# Patient Record
Sex: FEMALE | Race: BLACK OR AFRICAN AMERICAN | NOT HISPANIC OR LATINO | Employment: UNEMPLOYED | URBAN - METROPOLITAN AREA
[De-identification: names, ages, dates, MRNs, and addresses within clinical notes are randomized per-mention and may not be internally consistent; named-entity substitution may affect disease eponyms.]

---

## 2024-01-11 ENCOUNTER — APPOINTMENT (OUTPATIENT)
Dept: CARDIOLOGY | Facility: HOSPITAL | Age: 34
End: 2024-01-11
Payer: COMMERCIAL

## 2024-01-11 ENCOUNTER — HOSPITAL ENCOUNTER (EMERGENCY)
Facility: HOSPITAL | Age: 34
Discharge: HOME | End: 2024-01-11
Attending: EMERGENCY MEDICINE
Payer: COMMERCIAL

## 2024-01-11 ENCOUNTER — HOSPITAL ENCOUNTER (OUTPATIENT)
Facility: HOSPITAL | Age: 34
Discharge: HOME | End: 2024-01-11
Attending: OBSTETRICS & GYNECOLOGY | Admitting: OBSTETRICS & GYNECOLOGY
Payer: COMMERCIAL

## 2024-01-11 VITALS
SYSTOLIC BLOOD PRESSURE: 121 MMHG | WEIGHT: 249.98 LBS | HEART RATE: 96 BPM | DIASTOLIC BLOOD PRESSURE: 78 MMHG | RESPIRATION RATE: 18 BRPM | TEMPERATURE: 98.2 F | HEIGHT: 64 IN | BODY MASS INDEX: 42.68 KG/M2 | OXYGEN SATURATION: 98 %

## 2024-01-11 VITALS
SYSTOLIC BLOOD PRESSURE: 114 MMHG | BODY MASS INDEX: 42.68 KG/M2 | HEIGHT: 64 IN | HEART RATE: 86 BPM | DIASTOLIC BLOOD PRESSURE: 74 MMHG | OXYGEN SATURATION: 97 % | WEIGHT: 250 LBS | TEMPERATURE: 98.2 F | RESPIRATION RATE: 18 BRPM

## 2024-01-11 DIAGNOSIS — R07.89 ATYPICAL CHEST PAIN: Primary | ICD-10-CM

## 2024-01-11 LAB
ABO GROUP (TYPE) IN BLOOD: NORMAL
ALBUMIN SERPL BCP-MCNC: 3.2 G/DL (ref 3.4–5)
ALP SERPL-CCNC: 99 U/L (ref 33–110)
ALT SERPL W P-5'-P-CCNC: 20 U/L (ref 7–45)
ANION GAP SERPL CALC-SCNC: 16 MMOL/L (ref 10–20)
ANTIBODY SCREEN: NORMAL
AST SERPL W P-5'-P-CCNC: 13 U/L (ref 9–39)
ATRIAL RATE: 95 BPM
BILIRUB DIRECT SERPL-MCNC: 0 MG/DL (ref 0–0.3)
BILIRUB SERPL-MCNC: 0.2 MG/DL (ref 0–1.2)
BUN SERPL-MCNC: 7 MG/DL (ref 6–23)
CALCIUM SERPL-MCNC: 8.6 MG/DL (ref 8.6–10.3)
CARDIAC TROPONIN I PNL SERPL HS: <3 NG/L (ref 0–13)
CHLORIDE SERPL-SCNC: 104 MMOL/L (ref 98–107)
CO2 SERPL-SCNC: 19 MMOL/L (ref 21–32)
CREAT SERPL-MCNC: 0.34 MG/DL (ref 0.5–1.05)
EGFRCR SERPLBLD CKD-EPI 2021: >90 ML/MIN/1.73M*2
GLUCOSE SERPL-MCNC: 86 MG/DL (ref 74–99)
LDH SERPL L TO P-CCNC: 126 U/L (ref 84–246)
P AXIS: 31 DEGREES
P OFFSET: 201 MS
P ONSET: 143 MS
POTASSIUM SERPL-SCNC: 3.6 MMOL/L (ref 3.5–5.3)
PR INTERVAL: 144 MS
PROT SERPL-MCNC: 6.4 G/DL (ref 6.4–8.2)
Q ONSET: 215 MS
QRS COUNT: 16 BEATS
QRS DURATION: 92 MS
QT INTERVAL: 356 MS
QTC CALCULATION(BAZETT): 447 MS
QTC FREDERICIA: 414 MS
R AXIS: 14 DEGREES
RH FACTOR (ANTIGEN D): NORMAL
SODIUM SERPL-SCNC: 135 MMOL/L (ref 136–145)
T AXIS: 18 DEGREES
T OFFSET: 393 MS
VENTRICULAR RATE: 95 BPM

## 2024-01-11 PROCEDURE — 36415 COLL VENOUS BLD VENIPUNCTURE: CPT

## 2024-01-11 PROCEDURE — 83615 LACTATE (LD) (LDH) ENZYME: CPT | Performed by: ADVANCED PRACTICE MIDWIFE

## 2024-01-11 PROCEDURE — 84484 ASSAY OF TROPONIN QUANT: CPT | Performed by: ADVANCED PRACTICE MIDWIFE

## 2024-01-11 PROCEDURE — 93005 ELECTROCARDIOGRAM TRACING: CPT

## 2024-01-11 PROCEDURE — 99284 EMERGENCY DEPT VISIT MOD MDM: CPT | Performed by: EMERGENCY MEDICINE

## 2024-01-11 PROCEDURE — 36415 COLL VENOUS BLD VENIPUNCTURE: CPT | Performed by: ADVANCED PRACTICE MIDWIFE

## 2024-01-11 PROCEDURE — 84075 ASSAY ALKALINE PHOSPHATASE: CPT | Performed by: ADVANCED PRACTICE MIDWIFE

## 2024-01-11 PROCEDURE — 99204 OFFICE O/P NEW MOD 45 MIN: CPT | Performed by: ADVANCED PRACTICE MIDWIFE

## 2024-01-11 PROCEDURE — 99283 EMERGENCY DEPT VISIT LOW MDM: CPT

## 2024-01-11 PROCEDURE — 86901 BLOOD TYPING SEROLOGIC RH(D): CPT | Performed by: ADVANCED PRACTICE MIDWIFE

## 2024-01-11 PROCEDURE — 82248 BILIRUBIN DIRECT: CPT | Performed by: ADVANCED PRACTICE MIDWIFE

## 2024-01-11 PROCEDURE — 99214 OFFICE O/P EST MOD 30 MIN: CPT

## 2024-01-11 RX ORDER — LIDOCAINE HYDROCHLORIDE 10 MG/ML
0.5 INJECTION INFILTRATION; PERINEURAL ONCE AS NEEDED
Status: DISCONTINUED | OUTPATIENT
Start: 2024-01-11 | End: 2024-01-11 | Stop reason: HOSPADM

## 2024-01-11 RX ORDER — NIFEDIPINE 10 MG/1
10 CAPSULE ORAL ONCE AS NEEDED
Status: DISCONTINUED | OUTPATIENT
Start: 2024-01-11 | End: 2024-01-11 | Stop reason: HOSPADM

## 2024-01-11 RX ORDER — FERROUS SULFATE 325(65) MG
325 TABLET ORAL
COMMUNITY

## 2024-01-11 RX ORDER — HYDRALAZINE HYDROCHLORIDE 20 MG/ML
5 INJECTION INTRAMUSCULAR; INTRAVENOUS ONCE AS NEEDED
Status: DISCONTINUED | OUTPATIENT
Start: 2024-01-11 | End: 2024-01-11 | Stop reason: HOSPADM

## 2024-01-11 RX ORDER — LABETALOL HYDROCHLORIDE 5 MG/ML
20 INJECTION, SOLUTION INTRAVENOUS ONCE AS NEEDED
Status: DISCONTINUED | OUTPATIENT
Start: 2024-01-11 | End: 2024-01-11 | Stop reason: HOSPADM

## 2024-01-11 RX ORDER — ONDANSETRON HYDROCHLORIDE 2 MG/ML
4 INJECTION, SOLUTION INTRAVENOUS EVERY 6 HOURS PRN
Status: DISCONTINUED | OUTPATIENT
Start: 2024-01-11 | End: 2024-01-11 | Stop reason: HOSPADM

## 2024-01-11 RX ORDER — ONDANSETRON 4 MG/1
4 TABLET, FILM COATED ORAL EVERY 6 HOURS PRN
Status: DISCONTINUED | OUTPATIENT
Start: 2024-01-11 | End: 2024-01-11 | Stop reason: HOSPADM

## 2024-01-11 ASSESSMENT — COLUMBIA-SUICIDE SEVERITY RATING SCALE - C-SSRS
6. HAVE YOU EVER DONE ANYTHING, STARTED TO DO ANYTHING, OR PREPARED TO DO ANYTHING TO END YOUR LIFE?: NO
2. HAVE YOU ACTUALLY HAD ANY THOUGHTS OF KILLING YOURSELF?: NO
1. IN THE PAST MONTH, HAVE YOU WISHED YOU WERE DEAD OR WISHED YOU COULD GO TO SLEEP AND NOT WAKE UP?: NO

## 2024-01-11 ASSESSMENT — PAIN DESCRIPTION - ORIENTATION: ORIENTATION: RIGHT

## 2024-01-11 ASSESSMENT — PAIN SCALES - GENERAL
PAINLEVEL_OUTOF10: 6
PAINLEVEL_OUTOF10: 7

## 2024-01-11 ASSESSMENT — PAIN - FUNCTIONAL ASSESSMENT
PAIN_FUNCTIONAL_ASSESSMENT: 0-10
PAIN_FUNCTIONAL_ASSESSMENT: 0-10

## 2024-01-11 ASSESSMENT — PAIN DESCRIPTION - DESCRIPTORS
DESCRIPTORS: SHARP
DESCRIPTORS: SHARP

## 2024-01-11 ASSESSMENT — PAIN DESCRIPTION - LOCATION: LOCATION: CHEST

## 2024-01-11 NOTE — ED TRIAGE NOTES
Pt arrived via EMS from home with chief c/o of R sided CP under R breast starting last night. Pt is 8 months pregnant, IVONNE 2/24. Pt denies SOB, dizziness, N/V, ABD pain. Pt states the pain was worse last night. Pt denies any cardiac hx. Pt states baby is measuring small but no other problems with this pregnancy. Pt is Aox3.

## 2024-01-11 NOTE — ED NOTES
Chw talked to patient regarding not having a primary care physician. Patient expressed that she does have a primary care physician, but can' t remember their name at this time. Patient has [email or web address] insurance and patient expressed appreciation.     TYSHAWN Lorenzo  01/11/24 1000

## 2024-01-11 NOTE — H&P
"Obstetrical Triage Note    Reason for Triage Observation: Non-stress Test    Assessment   Pain in right upper abdomen, nausea, fatigue since yesterday  Triage Testing revealed Reactive NST, no uterine ctxs, normal labs   Patient's complaints have improved.    Plan   Discharge home.     Subjective   History of Present Pregnancy  Alexandra Bhakta is a 33 y.o.  gravid, female. Patient is pregnant. with an Estimated Date of Delivery of 2024 by LMP and consistent with US dating, who is now 33.5 weeks gestation. The patient's blood type is B POS. Her prenatal provider is [facility], available records reviewed.    Pregnancy notable for: BMI 42, anemia, prior csection x2    Triage Course:  Seen in ED, had EKG completed and was transferred to birth center for further evaluation. Normal fetal tracing and labs. Pt admits to relationship issues with her , does not admit to abuse or feeling unsafe in her home    Objective   Recent Vital Signs:                                 /78   Pulse 96   Temp 36.8 °C (98.2 °F) (Temporal)   Resp 18   Ht 1.626 m (5' 4\")   Wt 113 kg (249 lb 15.7 oz)   BMI 42.91 kg/m²     BP & Temp Min/Max Last 24 Hours:     BP  Min: 114/74   Min taken time: 24 1000  Max: 129/85   Max taken time: 24 0931  Temp  Av.8 °C (98.2 °F)  Min: 36.8 °C (98.2 °F)   Min taken time: 24 0910  Max: 36.8 °C (98.2 °F)   Max taken time: 24 0910    Physical Examination:  Constitutional: Alert and in no acute distress. Well developed, well nourished.  Head and Face: Head and face: Normal.   External inspection of ears and nose: Normal.  Pulmonary: Normal respiratory effort.  Genitourinary: External genitalia: Normal.  Uterus: Normal.   Right Adnexa/parametria: Normal.   Left Adnexa/parametria: Normal.   Inspection of Perianal Area: Normal.  Psychiatric: Alert and oriented x 3. Affect normal to patient baseline. Mood: Appropriate  Fetal: Baseline 140 with moderate variability and " accels present, no decels. Cat 1 tracing  Obstetrical: SVE deferred      Lab Review:     Lab Results   Component Value Date    AST 13 01/11/2024    ALT 20 01/11/2024    ALKPHOS 99 01/11/2024    BILITOT 0.2 01/11/2024    ALBUMIN 3.2 (L) 01/11/2024     Troponin normal  CBC anemic, normal platelets  Normal hepatic function panel  Normal LDH    Follow up with OB provider, see discharge instructions.    MANUEL Haider-ESTEFANIM

## 2024-01-30 NOTE — ED PROVIDER NOTES
HPI   Chief Complaint   Patient presents with    Chest Pain       HPI: []  33-year-old female about 8 months pregnant comes in with the pain under the right breast off-and-on for the last 24 hours.  No shortness of breath no nausea vomit diarrhea no fever no chills no vaginal spotting bleeding or discharge no cough or shortness breath no hemoptysis no hematemesis melena or hematochezia no urinary frequency urgency or hematuria.  Patient denies any contractions or denies leakage of fluid or denies cramping or vaginal bleeding    Past history: None  Social: Patient denies current tobacco alcohol drug abuse.  REVIEW OF SYSTEMS:    GENERAL.: No weight loss, fatigue, anorexia, insomnia, fever.    EYES: No vision loss, double vision, drainage, eye pain.    ENT: No pharyngitis, dry mouth.    CARDIOPULMONARY: No chest pain, palpitations, syncope, near syncope. No shortness of breath, cough, hemoptysis.    GI: Positive upper abdominal pain, change in bowel habits, melena, hematemesis, hematochezia, nausea, vomiting, diarrhea.    : No discharge, dysuria, frequency, urgency, hematuria.    MS: No limb pain, joint pain, joint swelling.    SKIN: No rashes.    PSYCH: No depression, anxiety, suicidality, homicidality.    Review of systems is otherwise negative unless stated above or in history of present illness.  Social history, family history, allergies reviewed.  PHYSICAL EXAM:    GENERAL: Vitals noted, no distress. Alert and oriented  x 3. Non-toxic.      EENT: TMs clear. Posterior oropharynx unremarkable. No meningismus. No LAD.     NECK: Supple. Nontender. No midline tenderness.     CARDIAC: Regular, rate, rhythm. No murmurs rubs or gallops. No JVD    PULMONARY: Lungs clear bilaterally with good aeration. No wheezes rales or rhonchi. No respiratory distress.     ABDOMEN: Soft, nonsurgical.  Gravid uterus nontender. No peritoneal signs. Normoactive bowel sounds. No pulsatile masses.  Negative CVA tenderness    EXTREMITIES:  No peripheral edema. Negative Homans bilaterally, no cords.  2+ bounding pulses well-perfused    SKIN: No rash. Intact.     NEURO: No focal neurologic deficits, NIH score of 0. Cranial nerves normal as tested from II through XII.     MEDICAL DECISION MAKING:  EKG on my interpretation shows normal sinus rhythm normal axis rate mid 80s with no acute ischemic changes.    Impression: #1third  trimester pregnancy, #2 atypical chest pain    Plan set MDM: Young 33-year-old female 8 months pregnant comes in with atypical chest pain low suspicion for STEMI NSTEMI ACS dissection pulm embolism or imminent miscarriage and/or imminent delivery patient medically cleared and will transfer to labor and delivery for further care.                          No data recorded                Patient History   No past medical history on file.  No past surgical history on file.  No family history on file.  Social History     Tobacco Use    Smoking status: Not on file    Smokeless tobacco: Not on file   Substance Use Topics    Alcohol use: Not on file    Drug use: Not on file       Physical Exam   ED Triage Vitals [01/11/24 0910]   Temperature Heart Rate Respirations BP   36.8 °C (98.2 °F) 94 18 119/80      Pulse Ox Temp Source Heart Rate Source Patient Position   99 % Oral -- --      BP Location FiO2 (%)     Left arm --       Physical Exam    ED Course & MDM   Diagnoses as of 01/30/24 4532   Atypical chest pain       Medical Decision Making      Procedure  Procedures     Arun Muhammad MD  01/30/24 1841

## 2024-11-27 ENCOUNTER — DOCTOR'S OFFICE (OUTPATIENT)
Dept: URBAN - NONMETROPOLITAN AREA CLINIC 2 | Facility: CLINIC | Age: 34
Setting detail: OPHTHALMOLOGY
End: 2024-11-27
Payer: COMMERCIAL

## 2024-11-27 ENCOUNTER — RX ONLY (RX ONLY)
Age: 34
End: 2024-11-27

## 2024-11-27 ENCOUNTER — OPTICAL OFFICE (OUTPATIENT)
Dept: URBAN - NONMETROPOLITAN AREA CLINIC 5 | Facility: CLINIC | Age: 34
Setting detail: OPHTHALMOLOGY
End: 2024-11-27
Payer: COMMERCIAL

## 2024-11-27 DIAGNOSIS — H52.223: ICD-10-CM

## 2024-11-27 DIAGNOSIS — H52.13: ICD-10-CM

## 2024-11-27 PROBLEM — Z01.00 ENCOUNTER FOR EXAMINATION OF EYES AND VISION WITHOUT ABNORMAL FINDINGS 
- GOOD OCULAR HEALTH FROM EXTENT OF UNDILATED VIEW: Status: ACTIVE | Noted: 2024-11-27

## 2024-11-27 PROCEDURE — 92004 COMPRE OPH EXAM NEW PT 1/>: CPT

## 2024-11-27 PROCEDURE — 92015 DETERMINE REFRACTIVE STATE: CPT

## 2024-11-27 PROCEDURE — V2111 SPHEROCYLINDR 7.25D/.25-2.25: HCPCS

## 2024-11-27 PROCEDURE — V2784 LENS POLYCARB OR EQUAL: HCPCS

## 2024-11-27 PROCEDURE — V2784 LENS POLYCARB OR EQUAL: HCPCS | Mod: LT

## 2024-11-27 PROCEDURE — V2111 SPHEROCYLINDR 7.25D/.25-2.25: HCPCS | Mod: LT

## 2024-11-27 PROCEDURE — V2020 VISION SVCS FRAMES PURCHASES: HCPCS

## 2024-11-27 ASSESSMENT — REFRACTION_AUTOREFRACTION
OD_CYLINDER: -1.00
OD_AXIS: 060
OD_SPHERE: -8.50
OS_AXIS: 000
OS_SPHERE: -8.75
OS_CYLINDER: 0.00

## 2024-11-27 ASSESSMENT — REFRACTION_CURRENTRX
OD_AXIS: 064
OD_VPRISM_DIRECTION: SV
OS_VPRISM_DIRECTION: SV
OS_SPHERE: -9.25
OS_CYLINDER: -0.75
OS_OVR_VA: 20/
OD_OVR_VA: 20/
OD_CYLINDER: -1.25
OS_AXIS: 159
OD_SPHERE: -9.00

## 2024-11-27 ASSESSMENT — REFRACTION_MANIFEST
OD_AXIS: 060
OS_VA1: 20/20
OD_SPHERE: -9.00
OS_VA2: 20/20
OD_VA1: 20/20
OU_VA: 20/20
OD_VA2: 20/20
OD_CYLINDER: -1.00
OS_AXIS: 155
OS_SPHERE: -9.00
OS_CYLINDER: -0.75

## 2024-11-27 ASSESSMENT — VISUAL ACUITY
OS_BCVA: 20/20-2
OD_BCVA: 20/20

## 2024-11-27 ASSESSMENT — TONOMETRY
OD_IOP_MMHG: 16
OS_IOP_MMHG: 16

## 2024-11-27 ASSESSMENT — CONFRONTATIONAL VISUAL FIELD TEST (CVF)
OS_FINDINGS: FULL
OD_FINDINGS: FULL